# Patient Record
Sex: MALE | Race: WHITE | NOT HISPANIC OR LATINO | URBAN - METROPOLITAN AREA
[De-identification: names, ages, dates, MRNs, and addresses within clinical notes are randomized per-mention and may not be internally consistent; named-entity substitution may affect disease eponyms.]

---

## 2017-11-07 ENCOUNTER — APPOINTMENT (RX ONLY)
Dept: URBAN - METROPOLITAN AREA CLINIC 46 | Facility: CLINIC | Age: 32
Setting detail: DERMATOLOGY
End: 2017-11-07

## 2017-11-07 DIAGNOSIS — Z41.9 ENCOUNTER FOR PROCEDURE FOR PURPOSES OTHER THAN REMEDYING HEALTH STATE, UNSPECIFIED: ICD-10-CM

## 2017-11-07 PROCEDURE — ? LASER HAIR REMOVAL

## 2017-11-07 PROCEDURE — ? LASER BROWN SPOTS

## 2017-11-07 ASSESSMENT — LOCATION ZONE DERM: LOCATION ZONE: FACE

## 2017-11-07 ASSESSMENT — LOCATION DETAILED DESCRIPTION DERM: LOCATION DETAILED: RIGHT LATERAL EYEBROW

## 2017-11-07 ASSESSMENT — LOCATION SIMPLE DESCRIPTION DERM: LOCATION SIMPLE: RIGHT EYEBROW

## 2017-11-07 NOTE — PROCEDURE: LASER HAIR REMOVAL
Number Of Prepaid Treatments (Will Not Render If 0): 0
Spot Size: 18 mm
Fluence (Will Not Render If 0): 9[facility]
Fluence (Will Not Render If 0): 8
Shaving (Optional): The patient shaved at home
Pulse Duration: 3 ms
Laser Type: Alexandrite 755nm
Consent: Risks reviewed including but not limited to crusting, scabbing, blistering, scarring, darker or lighter pigmentary change, paradoxical hair regrowth, incomplete removal of hair. Consent obtained at previous visit.
Treatment Number: 4
Post-Care Instructions: I reviewed with the patient in detail post-care instructions. Patient should avoid sun for a minimum of 4 weeks before and after treatment. \\nTolerated procedure well.
Render Post-Care In The Note: No
Detail Level: Generalized
Cooling: DCD 40/40
Cooling Override: cryogen
Fluence (Will Not Render If 0): 12
Pre-Procedure: all present put on their eye protection.
Fluence (Will Not Render If 0): 6
Post-Procedure Care: Immediate endpoint: perifollicular erythema. Post care reviewed with patient. Tolerated procedure well.  Applied hydrocortisone and applied sunscreen per patient request.
Tolerated Procedure (Optional): Tolerated Well

## 2017-11-07 NOTE — PROCEDURE: LASER BROWN SPOTS
Consent: risks reviewed including but not limited to crusting, scabbing, blistering, scarring, darker or lighter pigmentary change, and/or incomplete removal. Bacitracin applied
External Cooling Fan Speed: 0
Detail Level: Detailed
Post-Care Instructions: I reviewed with the patient in detail post-care instructions. verbal post care instructions given to patient. Patient is to apply bacitracin or neosporin with a q-tip to all crusted areas, and avoid picking at any scabs. Pt should stay away from the sun and wear sun protection until fully healed. verbal post care instructions given to patient. Tolerated procedure well.
Pulse Duration: 1.5 ms
Post Procedure Text: Cleaned skin with alcohol and let air dry. bacitracin applied. patient tolerated procedure well. Verbal and written Post care instructions reviewed with patient. Consent obtained and before photos taken.  \\n1.5mm , 550\\nBrown spots on the left cheeks and upper body\\nBacitracin applied
Laser Type: Q-switched Nd:Yag 1064nm

## 2018-07-26 ENCOUNTER — APPOINTMENT (RX ONLY)
Dept: URBAN - METROPOLITAN AREA CLINIC 46 | Facility: CLINIC | Age: 33
Setting detail: DERMATOLOGY
End: 2018-07-26

## 2018-07-26 DIAGNOSIS — Z41.9 ENCOUNTER FOR PROCEDURE FOR PURPOSES OTHER THAN REMEDYING HEALTH STATE, UNSPECIFIED: ICD-10-CM

## 2018-07-26 PROCEDURE — ? LASER HAIR REMOVAL

## 2018-07-26 NOTE — PROCEDURE: LASER HAIR REMOVAL
Render Post-Care In The Note: No
Fluence (Will Not Render If 0): [address] [facility]
Consent: Written consent obtained, risks reviewed including but not limited to crusting, scabbing, blistering, scarring, darker or lighter pigmentary change, paradoxical hair regrowth, incomplete removal of hair and infection.
Number Of Prepaid Treatments (Will Not Render If 0): 0
Fluence (Will Not Render If 0): 43[address] [facility]
Detail Level: Detailed
Post-Procedure Care: Immediate endpoint: perifollicular erythema and edema. Vaseline and ice applied. Post care reviewed with patient.
Pulse Duration: 5 ms
Fluence (Will Not Render If 0): 9[address] [facility]
Post-Care Instructions: I reviewed with the patient in detail post-care instructions. Patient should avoid sun for a minimum of 4 weeks before and after treatment.
Spot Size: 18 mm
Fluence (Will Not Render If 0): 20
Cooling: DCD 50/30
Pre-Procedure: Prior to proceeding the treatment areas were cleaned and all present put on their eye protection.
Anesthesia Type: 1% lidocaine with epinephrine
Laser Type: Nd:Yag 1064nm

## 2018-09-20 ENCOUNTER — APPOINTMENT (RX ONLY)
Dept: URBAN - METROPOLITAN AREA CLINIC 46 | Facility: CLINIC | Age: 33
Setting detail: DERMATOLOGY
End: 2018-09-20

## 2018-09-20 DIAGNOSIS — Z41.9 ENCOUNTER FOR PROCEDURE FOR PURPOSES OTHER THAN REMEDYING HEALTH STATE, UNSPECIFIED: ICD-10-CM

## 2018-09-20 PROCEDURE — ? LASER HAIR REMOVAL

## 2018-09-20 NOTE — PROCEDURE: LASER HAIR REMOVAL
Cooling: DCD 30/20
External Cooling Fan Speed: 0
Consent: Written consent obtained, risks reviewed including but not limited to crusting, scabbing, blistering, scarring, darker or lighter pigmentary change, paradoxical hair regrowth, incomplete removal of hair and infection.
Laser Type: Nd:Yag 1064nm
Spot Size: 18 mm
Detail Level: Detailed
Pulse Duration: 10 ms
Fluence (Will Not Render If 0): 10[address] [facility]
Cooling: DCD 40/20
Fluence (Will Not Render If 0): 12
Fluence (Will Not Render If 0): 20
Spot Size: 15 mm
Tolerated Procedure (Optional): Tolerated Well
Post-Procedure Care: Immediate endpoint: perifollicular erythema and edema. Hydrocortisone 2.5% applied to treatment area. Post care reviewed with patient.
Post-Care Instructions: I reviewed with the patient in detail post-care instructions. Patient should avoid sun for a minimum of 4 weeks before and after treatment.
Pre-Procedure: Prior to proceeding the treatment areas were cleaned and all present put on their eye protection.
Shaving (Optional): The patient shaved at home
Location Override: abdomen
Render Post-Care In The Note: No
Fluence (Will Not Render If 0): [address] [address]
Total Pulses: [address]

## 2018-11-08 ENCOUNTER — APPOINTMENT (RX ONLY)
Dept: URBAN - METROPOLITAN AREA CLINIC 46 | Facility: CLINIC | Age: 33
Setting detail: DERMATOLOGY
End: 2018-11-08

## 2019-01-22 ENCOUNTER — APPOINTMENT (RX ONLY)
Dept: URBAN - METROPOLITAN AREA CLINIC 46 | Facility: CLINIC | Age: 34
Setting detail: DERMATOLOGY
End: 2019-01-22

## 2019-01-22 DIAGNOSIS — Z41.9 ENCOUNTER FOR PROCEDURE FOR PURPOSES OTHER THAN REMEDYING HEALTH STATE, UNSPECIFIED: ICD-10-CM

## 2019-01-22 PROCEDURE — ? LASER HAIR REMOVAL

## 2019-01-22 NOTE — PROCEDURE: LASER HAIR REMOVAL
Pulse Duration: 5 ms
Treatment Number: 0
Render Post-Care In The Note: No
Spot Size: 15 mm
Pre-Procedure: Prior to proceeding the treatment areas were cleaned and all present put on their eye protection.
Tolerated Procedure (Optional): Tolerated Well
Fluence (Will Not Render If 0): 0[address] [facility]
Location Override: [address]
Detail Level: Detailed
Fluence (Will Not Render If 0): 801 [facility]
Pulse Duration: 10 ms
Post-Procedure Care: Immediate endpoint: perifollicular erythema and edema. Post care reviewed with patient. Educated patient on refraining from sun for two weeks prior and two weeks post treatment. Aloe and sunscreen applied.
Consent: Written consent obtained, risks reviewed including but not limited to crusting, scabbing, blistering, scarring, darker or lighter pigmentary change, paradoxical hair regrowth, incomplete removal of hair and infection.
Laser Type: Nd:Yag 1064nm
Shaving (Optional): The patient shaved at home
Cooling: DCD 40/20
Post-Care Instructions: I reviewed with the patient in detail post-care instructions. Patient should avoid sun for a minimum of 4 weeks before and after treatment.
Fluence (Will Not Render If 0): [address] [address]
Spot Size: 18 mm
Fluence (Will Not Render If 0): 20

## 2019-06-12 ENCOUNTER — APPOINTMENT (RX ONLY)
Dept: URBAN - METROPOLITAN AREA CLINIC 46 | Facility: CLINIC | Age: 34
Setting detail: DERMATOLOGY
End: 2019-06-12

## 2019-06-12 DIAGNOSIS — Z41.9 ENCOUNTER FOR PROCEDURE FOR PURPOSES OTHER THAN REMEDYING HEALTH STATE, UNSPECIFIED: ICD-10-CM

## 2019-06-12 PROCEDURE — ? LASER HAIR REMOVAL

## 2019-06-12 NOTE — HPI: COSMETIC (LASER HAIR REMOVAL)
Have You Had Laser Hair Removal Before?: has had previous treatment
When Outside In The Sun, Do You...: rarely burns, tans with ease

## 2019-06-12 NOTE — PROCEDURE: LASER HAIR REMOVAL
Pulse Duration: 10 ms
Treatment Number: 0
Detail Level: Detailed
Fluence (Will Not Render If 0): 801 [facility]
Spot Size: 18 mm
Pulse Duration: 5 ms
Consent: Written consent obtained, risks reviewed including but not limited to crusting, scabbing, blistering, scarring, darker or lighter pigmentary change, paradoxical hair regrowth, incomplete removal of hair and infection.
Laser Type: Nd:Yag 1064nm
Shaving (Optional): The patient was shaved in the office prior to the procedure
Spot Size: 15 mm
Cooling: DCD 40/20
Fluence (Will Not Render If 0): [address] [address]
Post-Care Instructions: I reviewed with the patient in detail post-care instructions. Patient should avoid sun for a minimum of 4 weeks before and after treatment.
Pre-Procedure: Prior to proceeding the treatment areas were cleaned and all present put on their eye protection.
Render Post-Care In The Note: No
Post-Procedure Care: Immediate endpoint: perifollicular erythema and edema. Post care reviewed with patient. Educated patient on refraining from sun for two weeks prior and two weeks post treatment.
Tolerated Procedure (Optional): Tolerated Well
Fluence (Will Not Render If 0): 22
Fluence (Will Not Render If 0): 5[address] [facility]

## 2020-02-13 ENCOUNTER — APPOINTMENT (RX ONLY)
Dept: URBAN - METROPOLITAN AREA CLINIC 46 | Facility: CLINIC | Age: 35
Setting detail: DERMATOLOGY
End: 2020-02-13

## 2020-02-13 DIAGNOSIS — Z41.9 ENCOUNTER FOR PROCEDURE FOR PURPOSES OTHER THAN REMEDYING HEALTH STATE, UNSPECIFIED: ICD-10-CM

## 2020-02-13 PROCEDURE — ? LASER HAIR REMOVAL

## 2020-02-13 NOTE — PROCEDURE: LASER HAIR REMOVAL
Total Area (Optional- Include Units): [place] 8fl, 10ms underchin- yag 22fl, 5ms
Fluence (Will Not Render If 0): 64[address] [facility]
Detail Level: Detailed
Treatment Number: 0
Fluence (Will Not Render If 0): 20
Render Post-Care In The Note: No
Fluence (Will Not Render If 0): 12
Pulse Duration: 10 ms
Spot Size: 18 mm
Shaving (Optional): The patient was shaved in the office prior to the procedure
Pre-Procedure: Prior to proceeding the treatment areas were cleaned and all present put on their eye protection.
Pulse Duration: 5 ms
Location Override: [address], strip under chin
Consent: Written consent obtained, risks reviewed including but not limited to crusting, scabbing, blistering, scarring, darker or lighter pigmentary change, paradoxical hair regrowth, incomplete removal of hair and infection.  Patient received MRI with contrast  prior to [facility] visit, told her we will go down on settings due to possible skin sensitivity
Fluence (Will Not Render If 0): 801 [facility]
Post-Procedure Care: Immediate endpoint: perifollicular erythema and edema. Post care reviewed with patient.  Hydrocortisone applied
Post-Care Instructions: I reviewed with the patient in detail post-care instructions. Patient should avoid sun for a minimum of 4 weeks before and after treatment.  Ice and hydrocortisone applied
Tolerated Procedure (Optional): Tolerated Well
Laser Type: Nd:Yag 1064nm

## 2020-03-12 ENCOUNTER — APPOINTMENT (RX ONLY)
Dept: URBAN - METROPOLITAN AREA CLINIC 46 | Facility: CLINIC | Age: 35
Setting detail: DERMATOLOGY
End: 2020-03-12

## 2020-03-12 DIAGNOSIS — Z41.9 ENCOUNTER FOR PROCEDURE FOR PURPOSES OTHER THAN REMEDYING HEALTH STATE, UNSPECIFIED: ICD-10-CM

## 2020-03-12 PROCEDURE — ? LASER HAIR REMOVAL

## 2020-03-12 NOTE — PROCEDURE: LASER HAIR REMOVAL
Pre-Procedure: Prior to proceeding the treatment areas were cleaned and all present put on their eye protection.
Fluence (Will Not Render If 0): 9530 [facility]
Number Of Prepaid Treatments (Will Not Render If 0): 0
Render Post-Care In The Note: No
Post-Procedure Care: Immediate endpoint: perifollicular erythema and edema. Post care reviewed with patient.  Hydrocortisone applied
Pulse Duration: 10 ms
Detail Level: Detailed
Spot Size: 18 mm
Tolerated Procedure (Optional): Tolerated Well
Fluence (Will Not Render If 0): 12
Laser Type: Alexandrite 755nm
Location Override: chin and [address]
Consent: Written consent obtained, risks reviewed including but not limited to crusting, scabbing, blistering, scarring, darker or lighter pigmentary change, paradoxical hair regrowth, incomplete removal of hair and infection.  Patient received MRI with contrast  prior to [facility] visit, told her we will go down on settings due to possible skin sensitivity
Fluence (Will Not Render If 0): 20
Post-Care Instructions: I reviewed with the patient in detail post-care instructions. Patient should avoid sun for a minimum of 4 weeks before and after treatment.  Ice and hydrocortisone applied
Shaving (Optional): The patient shaved at home